# Patient Record
Sex: FEMALE | Race: WHITE | Employment: FULL TIME
[De-identification: names, ages, dates, MRNs, and addresses within clinical notes are randomized per-mention and may not be internally consistent; named-entity substitution may affect disease eponyms.]

---

## 2017-11-26 ENCOUNTER — HEALTH MAINTENANCE LETTER (OUTPATIENT)
Age: 37
End: 2017-11-26

## 2020-03-02 ENCOUNTER — HEALTH MAINTENANCE LETTER (OUTPATIENT)
Age: 40
End: 2020-03-02

## 2020-06-25 ENCOUNTER — TRANSFERRED RECORDS (OUTPATIENT)
Dept: HEALTH INFORMATION MANAGEMENT | Facility: CLINIC | Age: 40
End: 2020-06-25

## 2020-07-24 ENCOUNTER — TRANSFERRED RECORDS (OUTPATIENT)
Dept: HEALTH INFORMATION MANAGEMENT | Facility: CLINIC | Age: 40
End: 2020-07-24

## 2020-07-30 ENCOUNTER — TRANSFERRED RECORDS (OUTPATIENT)
Dept: HEALTH INFORMATION MANAGEMENT | Facility: CLINIC | Age: 40
End: 2020-07-30

## 2020-08-25 ENCOUNTER — MEDICAL CORRESPONDENCE (OUTPATIENT)
Dept: HEALTH INFORMATION MANAGEMENT | Facility: CLINIC | Age: 40
End: 2020-08-25

## 2020-08-28 ENCOUNTER — OFFICE VISIT (OUTPATIENT)
Dept: CARDIOLOGY | Facility: CLINIC | Age: 40
End: 2020-08-28
Payer: COMMERCIAL

## 2020-08-28 VITALS
OXYGEN SATURATION: 98 % | WEIGHT: 278 LBS | HEART RATE: 80 BPM | HEIGHT: 69 IN | DIASTOLIC BLOOD PRESSURE: 86 MMHG | BODY MASS INDEX: 41.18 KG/M2 | SYSTOLIC BLOOD PRESSURE: 140 MMHG

## 2020-08-28 DIAGNOSIS — I10 ESSENTIAL HYPERTENSION: ICD-10-CM

## 2020-08-28 DIAGNOSIS — E11.9 DIABETES MELLITUS WITHOUT COMPLICATION (H): ICD-10-CM

## 2020-08-28 DIAGNOSIS — Z13.6 SCREENING FOR CARDIOVASCULAR CONDITION: ICD-10-CM

## 2020-08-28 DIAGNOSIS — Z13.6 SCREENING FOR CARDIOVASCULAR CONDITION: Primary | ICD-10-CM

## 2020-08-28 LAB
ANION GAP SERPL CALCULATED.3IONS-SCNC: 5 MMOL/L (ref 3–14)
BUN SERPL-MCNC: 10 MG/DL (ref 7–30)
CALCIUM SERPL-MCNC: 8.9 MG/DL (ref 8.5–10.1)
CHLORIDE SERPL-SCNC: 106 MMOL/L (ref 94–109)
CHOLEST SERPL-MCNC: 145 MG/DL
CO2 SERPL-SCNC: 28 MMOL/L (ref 20–32)
CREAT SERPL-MCNC: 0.92 MG/DL (ref 0.52–1.04)
GFR SERPL CREATININE-BSD FRML MDRD: 77 ML/MIN/{1.73_M2}
GLUCOSE SERPL-MCNC: 101 MG/DL (ref 70–99)
HDLC SERPL-MCNC: 38 MG/DL
LDLC SERPL CALC-MCNC: 79 MG/DL
NONHDLC SERPL-MCNC: 107 MG/DL
POTASSIUM SERPL-SCNC: 4 MMOL/L (ref 3.4–5.3)
SODIUM SERPL-SCNC: 139 MMOL/L (ref 133–144)
TRIGL SERPL-MCNC: 140 MG/DL

## 2020-08-28 PROCEDURE — 80061 LIPID PANEL: CPT | Performed by: INTERNAL MEDICINE

## 2020-08-28 PROCEDURE — 36415 COLL VENOUS BLD VENIPUNCTURE: CPT | Performed by: INTERNAL MEDICINE

## 2020-08-28 PROCEDURE — 99204 OFFICE O/P NEW MOD 45 MIN: CPT | Performed by: INTERNAL MEDICINE

## 2020-08-28 PROCEDURE — 93000 ELECTROCARDIOGRAM COMPLETE: CPT | Performed by: INTERNAL MEDICINE

## 2020-08-28 PROCEDURE — 80048 BASIC METABOLIC PNL TOTAL CA: CPT | Performed by: INTERNAL MEDICINE

## 2020-08-28 RX ORDER — ATORVASTATIN CALCIUM 10 MG/1
TABLET, FILM COATED ORAL
COMMUNITY
Start: 2020-08-21 | End: 2020-12-23

## 2020-08-28 RX ORDER — METFORMIN HCL 500 MG
1000 TABLET, EXTENDED RELEASE 24 HR ORAL 2 TIMES DAILY WITH MEALS
COMMUNITY
Start: 2020-02-10

## 2020-08-28 RX ORDER — ESCITALOPRAM OXALATE 10 MG/1
10 TABLET ORAL DAILY
COMMUNITY
Start: 2020-08-22

## 2020-08-28 RX ORDER — LISINOPRIL 10 MG/1
TABLET ORAL
COMMUNITY
Start: 2020-07-01 | End: 2020-08-28

## 2020-08-28 RX ORDER — LISINOPRIL 20 MG/1
20 TABLET ORAL DAILY
Qty: 30 TABLET | Refills: 11 | Status: SHIPPED | OUTPATIENT
Start: 2020-08-28 | End: 2020-12-23

## 2020-08-28 ASSESSMENT — MIFFLIN-ST. JEOR: SCORE: 1995.38

## 2020-08-28 NOTE — LETTER
8/28/2020      Jammie White MD, MD  [facility] [address]  [facility] [address]      RE: Belinda Cotton       Dear Colleague,    I had the pleasure of seeing Belinda Cotton in the [facility].    Service Date: 08/28/2020      NEW PATIENT REFERRAL      REFERRING PHYSICIAN:  Dr. Jammie White      HISTORY OF PRESENT ILLNESS:  Ms. Cottno is a very pleasant 39-year-old female who is referred to our clinic today due to hypertension and differential pressures in her arms.  She has morbid obesity and type 2 diabetes as well.  She was recently placed in the last few months on lisinopril and atorvastatin.  In looking through the notes, it looks like the atorvastatin was placed due to the presumption that she may have peripheral arterial disease in her upper extremities, causing the differential blood pressures.  She is unaware of any cholesterol problems.  She showed me a note from her physician at said that her HDL was low but there is no comment about LDLs and I am not able to pull these up, so I am not sure if high cholesterol may have been another factor for the Lipitor.  She has had 2 children.  She did have preeclampsia with one and gestational diabetes.  She has a family history of high blood pressure.  No history of premature coronary disease.  She is a nonsmoker.  She does like to ride her bike but has not been exercising on a regular basis.  She describes that she had some numbness and tingling in both hands at times and this along with some headaches led to the measurement of her blood pressures in both arms.  It was noted that she had a discrepancy of greater than 10 mmHg between her left and right arm, right being higher and therefore, an upper extremity Doppler was ordered.  This was done on 07/30.  It was done at an outside facility, [facility] in [place].  I do have a copy of the report stating normal bilateral upper extremity arterial Doppler variation without  sign of vascular stenosis.  In looking at this note, however, the interpretation states that analysis was performed of the bilateral subclavian, axillary, brachial, radial and ulnar arteries.  It did not talk about imaging of the aortic arch or upper descending aorta.  We did perform an electrocardiogram in office today which I have reviewed.  This essentially looks like a normal EKG.  I repeated her blood pressures in both arms.  Initially, the medical assistant had gotten a different measurement of about 8 mmHg.  However, I repeated this and got 140/86 in 1 arm 142/80 in the other, so I did not really see a significant difference.  I did note that the auscultation in the left arm was a bit more soft.  May be that she has a smaller brachial artery on this side but I did not see a discrepancy in the pressures at all.  Her pulse was around 80.  Weight is now 278.  It looks like she has lost over 30 pounds in the last couple of years with effort in changing her diet and lifestyle.      In summary, Ms. Wade is a pleasant 39-year-old female with a history of morbid obesity, hypertension, possible HDL deficiency and type 2 diabetes, presenting with hypertension and a discrepancy in blood pressures in her arms.  I did talk to Ms. Wade today about her plans.  She has 2 children now.  It sounds like she had hoped for more children and it sounds like and she and her  may still be trying.  She denies being on oral contraception right now.  I did caution her about the teratogenic effects of both Lipitor and lisinopril and if she is trying to get pregnant, we should reconsider these medications.  We can certainly work with her, if she chooses that she wants to have more children, on finding appropriate blood pressure medication for her.  I would like to recheck her cholesterol and see where it is at.  I know she has been on Lipitor for a few months now.  I would like to see her numbers prior to starting Lipitor to  see if this is actually necessary or not and certainly can be disrupted for a year if needed if she would like to pursue having children.  In the meantime, I cautioned her that she should be using contraception.  I would like to get an echocardiogram given her high blood pressure and obesity to look for any evidence of hypertensive heart disease or pulmonary hypertension.  I will also look at the aortic root, ascending aorta, arch and descending aorta for any abnormalities that may be leading to a discrepancy as well.  Coarctation of the aorta would be extremely rare in isolation and I do not hear any abnormal cardiac sounds to indicate congenital heart disease but I think that we should take a look at this for several reasons.  In addition, after talking with her at length, she wants to do what is best for her and I agree that lisinopril is a good choice if she is no longer pursuing children but she looks like she probably should need a little bit higher dose than the 10 mg she had been on because she is still borderline hypertensive and so I did increase her dose to 20 mg, once again cautioning her if she decides, once she gets home and discusses it with her , that if they still want to pursue children, we can always discontinue this medicine and try something different throughout that effort.  I will follow up with her with the results of her echocardiogram, cholesterol measurements and if she wants to make any change in her blood pressure management.  Please feel free to contact me with any questions you have in regards to her care and thank you for allowing me to participate in the care of your very nice patient.      cc:   [place] [address]         MAYDA BEAVERS DO             D: 2020   T: 2020   MT: KRISTEL      Name:     MARIELA INGRAM   MRN:      [id number]        Account:      [id number]   :       1980           Service Date: 08/28/2020      Document: [id number]         Outpatient Encounter Medications as of 8/28/2020   Medication Sig Dispense Refill     lisinopril (ZESTRIL) 20 MG tablet Take 1 tablet (20 mg) by mouth daily 30 tablet 11     atorvastatin (LIPITOR) 10 MG tablet        escitalopram (LEXAPRO) 10 MG tablet TK 1/2 T PO D FOR 6 DAYS. THEN 1 T D       metFORMIN (GLUCOPHAGE-XR) 500 MG 24 hr tablet        [DISCONTINUED] lisinopril (ZESTRIL) 10 MG tablet        [DISCONTINUED] NO ACTIVE MEDICATIONS        No facility-administered encounter medications on file as of 8/28/2020.        Again, thank you for allowing me to participate in the care of your patient.      Sincerely,    Gisela Regalado,      [facility]

## 2020-08-28 NOTE — PROGRESS NOTES
Service Date: 08/28/2020      NEW PATIENT REFERRAL      REFERRING PHYSICIAN:  Dr. Jammie White      HISTORY OF PRESENT ILLNESS:  Ms. Cotton is a very pleasant 39-year-old female who is referred to our clinic today due to hypertension and differential pressures in her arms.  She has morbid obesity and type 2 diabetes as well.  She was recently placed in the last few months on lisinopril and atorvastatin.  In looking through the notes, it looks like the atorvastatin was placed due to the presumption that she may have peripheral arterial disease in her upper extremities, causing the differential blood pressures.  She is unaware of any cholesterol problems.  She showed me a note from her physician at said that her HDL was low but there is no comment about LDLs and I am not able to pull these up, so I am not sure if high cholesterol may have been another factor for the Lipitor.  She has had 2 children.  She did have preeclampsia with one and gestational diabetes.  She has a family history of high blood pressure.  No history of premature coronary disease.  She is a nonsmoker.  She does like to ride her bike but has not been exercising on a regular basis.  She describes that she had some numbness and tingling in both hands at times and this along with some headaches led to the measurement of her blood pressures in both arms.  It was noted that she had a discrepancy of greater than 10 mmHg between her left and right arm, right being higher and therefore, an upper extremity Doppler was ordered.  This was done on 07/30.  It was done at an outside facility, [facility] in [place].  I do have a copy of the report stating normal bilateral upper extremity arterial Doppler variation without sign of vascular stenosis.  In looking at this note, however, the interpretation states that analysis was performed of the bilateral subclavian, axillary, brachial, radial and ulnar arteries.  It did not talk about imaging of the aortic arch or upper  descending aorta.  We did perform an electrocardiogram in office today which I have reviewed.  This essentially looks like a normal EKG.  I repeated her blood pressures in both arms.  Initially, the medical assistant had gotten a different measurement of about 8 mmHg.  However, I repeated this and got 140/86 in 1 arm 142/80 in the other, so I did not really see a significant difference.  I did note that the auscultation in the left arm was a bit more soft.  May be that she has a smaller brachial artery on this side but I did not see a discrepancy in the pressures at all.  Her pulse was around 80.  Weight is now 278.  It looks like she has lost over 30 pounds in the last couple of years with effort in changing her diet and lifestyle.      In summary, Ms. Wade is a pleasant 39-year-old female with a history of morbid obesity, hypertension, possible HDL deficiency and type 2 diabetes, presenting with hypertension and a discrepancy in blood pressures in her arms.  I did talk to Ms. Wade today about her plans.  She has 2 children now.  It sounds like she had hoped for more children and it sounds like and she and her  may still be trying.  She denies being on oral contraception right now.  I did caution her about the teratogenic effects of both Lipitor and lisinopril and if she is trying to get pregnant, we should reconsider these medications.  We can certainly work with her, if she chooses that she wants to have more children, on finding appropriate blood pressure medication for her.  I would like to recheck her cholesterol and see where it is at.  I know she has been on Lipitor for a few months now.  I would like to see her numbers prior to starting Lipitor to see if this is actually necessary or not and certainly can be disrupted for a year if needed if she would like to pursue having children.  In the meantime, I cautioned her that she should be using contraception.  I would like to get an echocardiogram  given her high blood pressure and obesity to look for any evidence of hypertensive heart disease or pulmonary hypertension.  I will also look at the aortic root, ascending aorta, arch and descending aorta for any abnormalities that may be leading to a discrepancy as well.  Coarctation of the aorta would be extremely rare in isolation and I do not hear any abnormal cardiac sounds to indicate congenital heart disease but I think that we should take a look at this for several reasons.  In addition, after talking with her at length, she wants to do what is best for her and I agree that lisinopril is a good choice if she is no longer pursuing children but she looks like she probably should need a little bit higher dose than the 10 mg she had been on because she is still borderline hypertensive and so I did increase her dose to 20 mg, once again cautioning her if she decides, once she gets home and discusses it with her , that if they still want to pursue children, we can always discontinue this medicine and try something different throughout that effort.  I will follow up with her with the results of her echocardiogram, cholesterol measurements and if she wants to make any change in her blood pressure management.  Please feel free to contact me with any questions you have in regards to her care and thank you for allowing me to participate in the care of your very nice patient.      cc:   [place] [address]         MAYDA BEAVERS DO             D: 2020   T: 2020   MT: KRISTEL      Name:     MARIELA INGRAM   MRN:      [id number]        Account:      [id number]   :      1980           Service Date: 2020      Document: [id number]

## 2020-08-28 NOTE — PROGRESS NOTES
HPI and Plan:   See dictation    Orders Placed This Encounter   Procedures     Basic metabolic panel     Lipid Profile     Follow-Up with Cardiologist     EKG 12-lead complete w/read - Clinics (performed today)     Echocardiogram Complete       Orders Placed This Encounter   Medications     DISCONTD: lisinopril (ZESTRIL) 10 MG tablet     escitalopram (LEXAPRO) 10 MG tablet     Sig: TK 1/2 T PO D FOR 6 DAYS. THEN 1 T D     atorvastatin (LIPITOR) 10 MG tablet     metFORMIN (GLUCOPHAGE-XR) 500 MG 24 hr tablet     lisinopril (ZESTRIL) 20 MG tablet     Sig: Take 1 tablet (20 mg) by mouth daily     Dispense:  30 tablet     Refill:  11       Medications Discontinued During This Encounter   Medication Reason     NO ACTIVE MEDICATIONS Medication Reconciliation Clean Up     lisinopril (ZESTRIL) 10 MG tablet Reorder         Encounter Diagnoses   Name Primary?     Screening for cardiovascular condition Yes     Essential hypertension      Diabetes mellitus without complication (H)        CURRENT MEDICATIONS:  Current Outpatient Medications   Medication Sig Dispense Refill     lisinopril (ZESTRIL) 20 MG tablet Take 1 tablet (20 mg) by mouth daily 30 tablet 11     atorvastatin (LIPITOR) 10 MG tablet        escitalopram (LEXAPRO) 10 MG tablet TK 1/2 T PO D FOR 6 DAYS. THEN 1 T D       metFORMIN (GLUCOPHAGE-XR) 500 MG 24 hr tablet          ALLERGIES   No Known Allergies    PAST MEDICAL HISTORY:  Past Medical History:   Diagnosis Date     Gestational diabetes 2008     PCOS (polycystic ovarian syndrome)     hx of     Preeclampsia 2001     Uterine fibroid        PAST SURGICAL HISTORY:  Past Surgical History:   Procedure Laterality Date     D & C  2007    missed ab     HC REMOVAL OF TONSILS,<13 Y/O  1983     THYROID SURGERY  1995       FAMILY HISTORY:  Family History   Problem Relation Age of Onset     Heart Disease Mother         pace maker in early 30's     Kidney Disease Mother         Kidney issues     Hypertension Father       Breast Cancer Paternal Grandmother      Breast Cancer Paternal Aunt      Lipids Father        SOCIAL HISTORY:  Social History     Socioeconomic History     Marital status:      Spouse name: None     Number of children: None     Years of education: None     Highest education level: None   Occupational History     None   Social Needs     Financial resource strain: None     Food insecurity     Worry: None     Inability: None     Transportation needs     Medical: None     Non-medical: None   Tobacco Use     Smoking status: Never Smoker     Smokeless tobacco: Never Used   Substance and Sexual Activity     Alcohol use: Yes     Alcohol/week: 0.0 standard drinks     Comment: socially     Drug use: No     Sexual activity: Yes     Partners: Male     Birth control/protection: None   Lifestyle     Physical activity     Days per week: None     Minutes per session: None     Stress: None   Relationships     Social connections     Talks on phone: None     Gets together: None     Attends [employer] service: None     Active member of club or organization: None     Attends meetings of clubs or organizations: None     Relationship status: None     Intimate partner violence     Fear of current or ex partner: None     Emotionally abused: None     Physically abused: None     Forced sexual activity: None   Other Topics Concern     None   Social History Narrative     None       Review of Systems:  Skin:  Negative       Eyes:  Positive for      ENT:  Negative      Respiratory:  Negative       Cardiovascular:  Negative      Gastroenterology: Negative      Genitourinary:  Negative      Musculoskeletal:  Negative   muscular cramping in upper  left arm  Neurologic:  Positive for numbness or tingling of hands;numbness or tingling of feet sometimes in the feet  Psychiatric:  Positive for excessive stress    Heme/Lymph/Imm:  Negative      Endocrine:  Positive for diabetes      Physical Exam:  Vitals: BP (!) 140/86 (BP Location: Left arm,  "Patient Position: Sitting, Cuff Size: Adult Regular)   Pulse 80   Ht 1.753 m (5' 9\")   Wt 126.1 kg (278 lb)   SpO2 98%   BMI 41.05 kg/m      Constitutional:  cooperative;in no acute distress        Skin:  warm and dry to the touch          Head:  normocephalic        Eyes:  pupils equal and round        Lymph:      ENT:  no pallor or cyanosis, dentition good        Neck:  carotid pulses are full and equal bilaterally        Respiratory:  clear to auscultation;normal symmetry         Cardiac: regular rhythm;no murmurs, gallops or rubs detected                pulses full and equal                                        GI:  abdomen soft obese      Extremities and Muscular Skeletal:  no deformities, clubbing, cyanosis, erythema observed;no edema              Neurological:  no gross motor deficits;affect appropriate        Psych:  Alert and Oriented x 3          CC  No referring provider defined for this encounter.                  "

## 2020-08-28 NOTE — LETTER
8/28/2020    Jammie White MD, MD  [facility] [address]  [facility] [address]    RE: Belinda Cotton       Dear Colleague,    I had the pleasure of seeing Belinda Cotton in the [facility].    HPI and Plan:   See dictation    Orders Placed This Encounter   Procedures     Basic metabolic panel     Lipid Profile     Follow-Up with Cardiologist     EKG 12-lead complete w/read - Clinics (performed today)     Echocardiogram Complete       Orders Placed This Encounter   Medications     DISCONTD: lisinopril (ZESTRIL) 10 MG tablet     escitalopram (LEXAPRO) 10 MG tablet     Sig: TK 1/2 T PO D FOR 6 DAYS. THEN 1 T D     atorvastatin (LIPITOR) 10 MG tablet     metFORMIN (GLUCOPHAGE-XR) 500 MG 24 hr tablet     lisinopril (ZESTRIL) 20 MG tablet     Sig: Take 1 tablet (20 mg) by mouth daily     Dispense:  30 tablet     Refill:  11       Medications Discontinued During This Encounter   Medication Reason     NO ACTIVE MEDICATIONS Medication Reconciliation Clean Up     lisinopril (ZESTRIL) 10 MG tablet Reorder         Encounter Diagnoses   Name Primary?     Screening for cardiovascular condition Yes     Essential hypertension      Diabetes mellitus without complication (H)        CURRENT MEDICATIONS:  Current Outpatient Medications   Medication Sig Dispense Refill     lisinopril (ZESTRIL) 20 MG tablet Take 1 tablet (20 mg) by mouth daily 30 tablet 11     atorvastatin (LIPITOR) 10 MG tablet        escitalopram (LEXAPRO) 10 MG tablet TK 1/2 T PO D FOR 6 DAYS. THEN 1 T D       metFORMIN (GLUCOPHAGE-XR) 500 MG 24 hr tablet          ALLERGIES   No Known Allergies    PAST MEDICAL HISTORY:  Past Medical History:   Diagnosis Date     Gestational diabetes 2008     PCOS (polycystic ovarian syndrome)     hx of     Preeclampsia 2001     Uterine fibroid        PAST SURGICAL HISTORY:  Past Surgical History:   Procedure Laterality Date     D & C  2007    missed ab     HC REMOVAL OF  TONSILS,<13 Y/O  1983     THYROID SURGERY  1995       FAMILY HISTORY:  Family History   Problem Relation Age of Onset     Heart Disease Mother         pace maker in early 30's     Kidney Disease Mother         Kidney issues     Hypertension Father      Breast Cancer Paternal Grandmother      Breast Cancer Paternal Aunt      Lipids Father        SOCIAL HISTORY:  Social History     Socioeconomic History     Marital status:      Spouse name: None     Number of children: None     Years of education: None     Highest education level: None   Occupational History     None   Social Needs     Financial resource strain: None     Food insecurity     Worry: None     Inability: None     Transportation needs     Medical: None     Non-medical: None   Tobacco Use     Smoking status: Never Smoker     Smokeless tobacco: Never Used   Substance and Sexual Activity     Alcohol use: Yes     Alcohol/week: 0.0 standard drinks     Comment: socially     Drug use: No     Sexual activity: Yes     Partners: Male     Birth control/protection: None   Lifestyle     Physical activity     Days per week: None     Minutes per session: None     Stress: None   Relationships     Social connections     Talks on phone: None     Gets together: None     Attends Holiness service: None     Active member of club or organization: None     Attends meetings of clubs or organizations: None     Relationship status: None     Intimate partner violence     Fear of current or ex partner: None     Emotionally abused: None     Physically abused: None     Forced sexual activity: None   Other Topics Concern     None   Social History Narrative     None       Review of Systems:  Skin:  Negative       Eyes:  Positive for      ENT:  Negative      Respiratory:  Negative       Cardiovascular:  Negative      Gastroenterology: Negative      Genitourinary:  Negative      Musculoskeletal:  Negative   muscular cramping in upper  left arm  Neurologic:  Positive for numbness or  "tingling of hands;numbness or tingling of feet sometimes in the feet  Psychiatric:  Positive for excessive stress    Heme/Lymph/Imm:  Negative      Endocrine:  Positive for diabetes      Physical Exam:  Vitals: BP (!) 140/86 (BP Location: Left arm, Patient Position: Sitting, Cuff Size: Adult Regular)   Pulse 80   Ht 1.753 m (5' 9\")   Wt 126.1 kg (278 lb)   SpO2 98%   BMI 41.05 kg/m      Constitutional:  cooperative;in no acute distress        Skin:  warm and dry to the touch          Head:  normocephalic        Eyes:  pupils equal and round        Lymph:      ENT:  no pallor or cyanosis, dentition good        Neck:  carotid pulses are full and equal bilaterally        Respiratory:  clear to auscultation;normal symmetry         Cardiac: regular rhythm;no murmurs, gallops or rubs detected                pulses full and equal                                        GI:  abdomen soft obese      Extremities and Muscular Skeletal:  no deformities, clubbing, cyanosis, erythema observed;no edema              Neurological:  no gross motor deficits;affect appropriate        Psych:  Alert and Oriented x 3          CC  No referring provider defined for this encounter.                    Thank you for allowing me to participate in the care of your patient.      Sincerely,     Gisela Regalado,      [facility]    cc:   No referring provider defined for this encounter.        "

## 2020-08-28 NOTE — PATIENT INSTRUCTIONS
METABOLIC SYNDROME    WARNING: IF YOU WANT TO HAVE CHILDREN, NEED TO STOP LISINOPRIL AND LIPITOR. WE CAN WORK WITH YOU DURING THIS TIME TO TREAT YOUR BLOOD PRESSURE SAFELY.

## 2020-09-01 ENCOUNTER — TELEPHONE (OUTPATIENT)
Dept: CARDIOLOGY | Facility: CLINIC | Age: 40
End: 2020-09-01

## 2020-09-02 ENCOUNTER — HOSPITAL ENCOUNTER (OUTPATIENT)
Dept: CARDIOLOGY | Facility: CLINIC | Age: 40
Discharge: HOME | End: 2020-09-02
Attending: INTERNAL MEDICINE | Admitting: INTERNAL MEDICINE
Payer: COMMERCIAL

## 2020-09-02 DIAGNOSIS — Z13.6 SCREENING FOR CARDIOVASCULAR CONDITION: ICD-10-CM

## 2020-09-02 DIAGNOSIS — E11.9 DIABETES MELLITUS WITHOUT COMPLICATION (H): Primary | ICD-10-CM

## 2020-09-02 DIAGNOSIS — I10 ESSENTIAL HYPERTENSION: ICD-10-CM

## 2020-09-02 PROCEDURE — 93306 TTE W/DOPPLER COMPLETE: CPT | Mod: 26 | Performed by: INTERNAL MEDICINE

## 2020-09-02 PROCEDURE — 93306 TTE W/DOPPLER COMPLETE: CPT

## 2020-09-02 PROCEDURE — 25500064 ZZH RX 255 OP 636: Performed by: INTERNAL MEDICINE

## 2020-09-02 RX ADMIN — HUMAN ALBUMIN MICROSPHERES AND PERFLUTREN 9 ML: 10; .22 INJECTION, SOLUTION INTRAVENOUS at 08:31

## 2020-09-25 ENCOUNTER — OFFICE VISIT (OUTPATIENT)
Dept: CARDIOLOGY | Facility: CLINIC | Age: 40
End: 2020-09-25
Attending: INTERNAL MEDICINE
Payer: COMMERCIAL

## 2020-09-25 VITALS
HEIGHT: 69 IN | WEIGHT: 277.6 LBS | DIASTOLIC BLOOD PRESSURE: 86 MMHG | HEART RATE: 78 BPM | SYSTOLIC BLOOD PRESSURE: 134 MMHG | BODY MASS INDEX: 41.12 KG/M2

## 2020-09-25 DIAGNOSIS — I10 ESSENTIAL HYPERTENSION: ICD-10-CM

## 2020-09-25 DIAGNOSIS — Z13.6 SCREENING FOR CARDIOVASCULAR CONDITION: ICD-10-CM

## 2020-09-25 DIAGNOSIS — E11.9 DIABETES MELLITUS WITHOUT COMPLICATION (H): ICD-10-CM

## 2020-09-25 PROCEDURE — 99214 OFFICE O/P EST MOD 30 MIN: CPT | Performed by: INTERNAL MEDICINE

## 2020-09-25 RX ORDER — LABETALOL 200 MG/1
200 TABLET, FILM COATED ORAL 2 TIMES DAILY
Qty: 60 TABLET | Refills: 11 | Status: SHIPPED | OUTPATIENT
Start: 2020-09-25

## 2020-09-25 ASSESSMENT — MIFFLIN-ST. JEOR: SCORE: 1988.57

## 2020-09-25 NOTE — LETTER
9/25/2020      Jammie White MD, MD  [facility] [address]  [facility] [address]      RE: Belinda Cotton       Dear Colleague,    I had the pleasure of seeing Belinda Cotton in the [facility].    Service Date: 09/25/2020      CARDIOLOGY CONSULTATION      HISTORY OF PRESENT ILLNESS:  Ms. Cotton is a very pleasant 40-year-old female with a history of hypertension, type 2 diabetes and obesity.  She was seen in late August due to a concern of a discrepancy in blood pressures between the left and right arms.  She did have an upper extremity ultrasound that did not show any evidence of arterial stenosis or vascular stenosis.  I did add an echocardiogram because of a history of high blood pressure to look at her aortic arch as well and she is here to go over those tests results.  The last time I saw her, we did increase her lisinopril dose for better blood pressure control and also talked about if she is considering having children that we would need to switch this medication and also take her off of statin therapy.        I went over the echocardiogram, which looks normal.  Images were somewhat technically difficult, but there were no abnormal velocity changes throughout the aorta or descending aorta.  LV function looked normal.  She has mild tricuspid insufficiency, but other valves looked normal.        Her blood pressure looks better today 134/86 and a pulse of 78.  She is actively trying to lose weight.  Her weight went down about a pound, down to 277.  She has noted more of a change in her clothing, which suggests she may be plateauing a bit and getting some muscle mass with her exercise and diet.  Her other physical exam findings were otherwise unchanged.        She did mention today that her and she and her  have discussed and would like to continue to pursue actively trying to have a child and so we talked about the teratogenic effects of ACE  inhibitor and statin therapy.  At this time, I am going to recommend she stop the Lipitor.  We will change her blood pressure medication to labetalol.  While there is no class A or B drugs in pregnancy, labetalol has long-term data suggesting its relatively safe.  I explained this to her.  However, lisinopril does have known teratogenic effects and therefore should not be continued while actively trying to have children, so I will switch her to 200 mg of labetalol twice daily and stop her Lipitor.        She will contact me through [employer] if she has any side effect.  I did go over the potential side effects with her by the way, and whether or not that dosage is appropriate to control her blood pressures.   We can discuss reinstituting statin therapy once she is through her childbearing years.        Please feel free to contact me with any questions you have in regards to her care.      cc:      Jammie White MD    [facility]   [address] [place] [address]         GISELA REGALADO DO             D: 2020   T: 2020   MT:       Name:     MARIELA INGRAM   MRN:      [id number]        Account:      [id number]   :      1980           Service Date: 2020      Document: [id number]          Outpatient Encounter Medications as of 2020   Medication Sig Dispense Refill     atorvastatin (LIPITOR) 10 MG tablet        escitalopram (LEXAPRO) 10 MG tablet TK 1/2 T PO D FOR 6 DAYS. THEN 1 T D       labetalol (NORMODYNE) 200 MG tablet Take 1 tablet (200 mg) by mouth 2 times daily 60 tablet 11     lisinopril (ZESTRIL) 20 MG tablet Take 1 tablet (20 mg) by mouth daily 30 tablet 11     metFORMIN (GLUCOPHAGE-XR) 500 MG 24 hr tablet        No facility-administered encounter medications on file as of 2020.        Again, thank you for allowing me to participate in the care of your patient.      Sincerely,    Gisela Regalado DO     [facility]  [facility]

## 2020-09-25 NOTE — LETTER
9/25/2020    Jammie White MD, MD  [facility] [address]  [facility] [address]    RE: Belinda Cotton       Dear Colleague,    I had the pleasure of seeing Belinda Cotton in the [facility].    HPI and Plan:   See dictation    No orders of the defined types were placed in this encounter.      Orders Placed This Encounter   Medications     labetalol (NORMODYNE) 200 MG tablet     Sig: Take 1 tablet (200 mg) by mouth 2 times daily     Dispense:  60 tablet     Refill:  11       There are no discontinued medications.      Encounter Diagnoses   Name Primary?     Screening for cardiovascular condition      Essential hypertension      Diabetes mellitus without complication (H)        CURRENT MEDICATIONS:  Current Outpatient Medications   Medication Sig Dispense Refill     atorvastatin (LIPITOR) 10 MG tablet        escitalopram (LEXAPRO) 10 MG tablet TK 1/2 T PO D FOR 6 DAYS. THEN 1 T D       labetalol (NORMODYNE) 200 MG tablet Take 1 tablet (200 mg) by mouth 2 times daily 60 tablet 11     lisinopril (ZESTRIL) 20 MG tablet Take 1 tablet (20 mg) by mouth daily 30 tablet 11     metFORMIN (GLUCOPHAGE-XR) 500 MG 24 hr tablet          ALLERGIES   No Known Allergies    PAST MEDICAL HISTORY:  Past Medical History:   Diagnosis Date     Gestational diabetes 2008     PCOS (polycystic ovarian syndrome)     hx of     Preeclampsia 2001     Uterine fibroid        PAST SURGICAL HISTORY:  Past Surgical History:   Procedure Laterality Date     D & C  2007    missed ab     HC REMOVAL OF TONSILS,<11 Y/O  1983     THYROID SURGERY  1995       FAMILY HISTORY:  Family History   Problem Relation Age of Onset     Heart Disease Mother         pace maker in early 30's     Kidney Disease Mother         Kidney issues     Hypertension Father      Lipids Father      Breast Cancer Paternal Grandmother      Breast Cancer Paternal Aunt        SOCIAL HISTORY:  Social History     Socioeconomic History      "Marital status:      Spouse name: None     Number of children: None     Years of education: None     Highest education level: None   Occupational History     None   Social Needs     Financial resource strain: None     Food insecurity     Worry: None     Inability: None     Transportation needs     Medical: None     Non-medical: None   Tobacco Use     Smoking status: Never Smoker     Smokeless tobacco: Never Used   Substance and Sexual Activity     Alcohol use: Yes     Alcohol/week: 0.0 standard drinks     Comment: socially     Drug use: No     Sexual activity: Yes     Partners: Male     Birth control/protection: None   Lifestyle     Physical activity     Days per week: None     Minutes per session: None     Stress: None   Relationships     Social connections     Talks on phone: None     Gets together: None     Attends Latter day service: None     Active member of club or organization: None     Attends meetings of clubs or organizations: None     Relationship status: None     Intimate partner violence     Fear of current or ex partner: None     Emotionally abused: None     Physically abused: None     Forced sexual activity: None   Other Topics Concern     None   Social History Narrative     None       Review of Systems:  Skin:  Negative       Eyes:  Positive for glasses    ENT:  Negative      Respiratory:  Negative       Cardiovascular:  Negative      Gastroenterology: Negative      Genitourinary:  Negative      Musculoskeletal:  Negative      Neurologic:  Negative      Psychiatric:  Negative      Heme/Lymph/Imm:  Negative      Endocrine:  Positive for diabetes      Physical Exam:  Vitals: /86 (BP Location: Right arm, Patient Position: Sitting, Cuff Size: Adult Regular)   Pulse 78   Ht 1.753 m (5' 9\")   Wt 125.9 kg (277 lb 9.6 oz)   LMP  (LMP Unknown)   Breastfeeding No   BMI 40.99 kg/m      Constitutional:  cooperative;in no acute distress        Skin:  warm and dry to the touch          Head:  " normocephalic        Eyes:  pupils equal and round        Lymph:      ENT:  no pallor or cyanosis, dentition good        Neck:  carotid pulses are full and equal bilaterally        Respiratory:  clear to auscultation;normal symmetry         Cardiac: regular rhythm;no murmurs, gallops or rubs detected                pulses full and equal                                        GI:  abdomen soft obese      Extremities and Muscular Skeletal:  no deformities, clubbing, cyanosis, erythema observed;no edema              Neurological:  no gross motor deficits;affect appropriate        Psych:  Alert and Oriented x 3          CC  Gisela Regalado DO  [address]  [place] [address]                    Thank you for allowing me to participate in the care of your patient.      Sincerely,     Gisela Regalado DO     [facility]    cc:   Gisela Regalado DO  [address]  [place] [address]

## 2020-09-25 NOTE — PROGRESS NOTES
HPI and Plan:   See dictation    No orders of the defined types were placed in this encounter.      Orders Placed This Encounter   Medications     labetalol (NORMODYNE) 200 MG tablet     Sig: Take 1 tablet (200 mg) by mouth 2 times daily     Dispense:  60 tablet     Refill:  11       There are no discontinued medications.      Encounter Diagnoses   Name Primary?     Screening for cardiovascular condition      Essential hypertension      Diabetes mellitus without complication (H)        CURRENT MEDICATIONS:  Current Outpatient Medications   Medication Sig Dispense Refill     atorvastatin (LIPITOR) 10 MG tablet        escitalopram (LEXAPRO) 10 MG tablet TK 1/2 T PO D FOR 6 DAYS. THEN 1 T D       labetalol (NORMODYNE) 200 MG tablet Take 1 tablet (200 mg) by mouth 2 times daily 60 tablet 11     lisinopril (ZESTRIL) 20 MG tablet Take 1 tablet (20 mg) by mouth daily 30 tablet 11     metFORMIN (GLUCOPHAGE-XR) 500 MG 24 hr tablet          ALLERGIES   No Known Allergies    PAST MEDICAL HISTORY:  Past Medical History:   Diagnosis Date     Gestational diabetes 2008     PCOS (polycystic ovarian syndrome)     hx of     Preeclampsia 2001     Uterine fibroid        PAST SURGICAL HISTORY:  Past Surgical History:   Procedure Laterality Date     D & C  2007    missed ab     HC REMOVAL OF TONSILS,<13 Y/O  1983     THYROID SURGERY  1995       FAMILY HISTORY:  Family History   Problem Relation Age of Onset     Heart Disease Mother         pace maker in early 30's     Kidney Disease Mother         Kidney issues     Hypertension Father      Lipids Father      Breast Cancer Paternal Grandmother      Breast Cancer Paternal Aunt        SOCIAL HISTORY:  Social History     Socioeconomic History     Marital status:      Spouse name: None     Number of children: None     Years of education: None     Highest education level: None   Occupational History     None   Social Needs     Financial resource strain: None     Food insecurity      "Worry: None     Inability: None     Transportation needs     Medical: None     Non-medical: None   Tobacco Use     Smoking status: Never Smoker     Smokeless tobacco: Never Used   Substance and Sexual Activity     Alcohol use: Yes     Alcohol/week: 0.0 standard drinks     Comment: socially     Drug use: No     Sexual activity: Yes     Partners: Male     Birth control/protection: None   Lifestyle     Physical activity     Days per week: None     Minutes per session: None     Stress: None   Relationships     Social connections     Talks on phone: None     Gets together: None     Attends Nondenominational service: None     Active member of club or organization: None     Attends meetings of clubs or organizations: None     Relationship status: None     Intimate partner violence     Fear of current or ex partner: None     Emotionally abused: None     Physically abused: None     Forced sexual activity: None   Other Topics Concern     None   Social History Narrative     None       Review of Systems:  Skin:  Negative       Eyes:  Positive for glasses    ENT:  Negative      Respiratory:  Negative       Cardiovascular:  Negative      Gastroenterology: Negative      Genitourinary:  Negative      Musculoskeletal:  Negative      Neurologic:  Negative      Psychiatric:  Negative      Heme/Lymph/Imm:  Negative      Endocrine:  Positive for diabetes      Physical Exam:  Vitals: /86 (BP Location: Right arm, Patient Position: Sitting, Cuff Size: Adult Regular)   Pulse 78   Ht 1.753 m (5' 9\")   Wt 125.9 kg (277 lb 9.6 oz)   LMP  (LMP Unknown)   Breastfeeding No   BMI 40.99 kg/m      Constitutional:  cooperative;in no acute distress        Skin:  warm and dry to the touch          Head:  normocephalic        Eyes:  pupils equal and round        Lymph:      ENT:  no pallor or cyanosis, dentition good        Neck:  carotid pulses are full and equal bilaterally        Respiratory:  clear to auscultation;normal symmetry     "     Cardiac: regular rhythm;no murmurs, gallops or rubs detected                pulses full and equal                                        GI:  abdomen soft obese      Extremities and Muscular Skeletal:  no deformities, clubbing, cyanosis, erythema observed;no edema              Neurological:  no gross motor deficits;affect appropriate        Psych:  Alert and Oriented x 3          CC  Gisela Regalado, DO  [address]  [place] [address]

## 2020-09-25 NOTE — PROGRESS NOTES
Service Date: 09/25/2020      CARDIOLOGY CONSULTATION      HISTORY OF PRESENT ILLNESS:  Ms. Cotton is a very pleasant 40-year-old female with a history of hypertension, type 2 diabetes and obesity.  She was seen in late August due to a concern of a discrepancy in blood pressures between the left and right arms.  She did have an upper extremity ultrasound that did not show any evidence of arterial stenosis or vascular stenosis.  I did add an echocardiogram because of a history of high blood pressure to look at her aortic arch as well and she is here to go over those tests results.  The last time I saw her, we did increase her lisinopril dose for better blood pressure control and also talked about if she is considering having children that we would need to switch this medication and also take her off of statin therapy.        I went over the echocardiogram, which looks normal.  Images were somewhat technically difficult, but there were no abnormal velocity changes throughout the aorta or descending aorta.  LV function looked normal.  She has mild tricuspid insufficiency, but other valves looked normal.        Her blood pressure looks better today 134/86 and a pulse of 78.  She is actively trying to lose weight.  Her weight went down about a pound, down to 277.  She has noted more of a change in her clothing, which suggests she may be plateauing a bit and getting some muscle mass with her exercise and diet.  Her other physical exam findings were otherwise unchanged.        She did mention today that her and she and her  have discussed and would like to continue to pursue actively trying to have a child and so we talked about the teratogenic effects of ACE inhibitor and statin therapy.  At this time, I am going to recommend she stop the Lipitor.  We will change her blood pressure medication to labetalol.  While there is no class A or B drugs in pregnancy, labetalol has long-term data suggesting its relatively  safe.  I explained this to her.  However, lisinopril does have known teratogenic effects and therefore should not be continued while actively trying to have children, so I will switch her to 200 mg of labetalol twice daily and stop her Lipitor.        She will contact me through [employer] if she has any side effect.  I did go over the potential side effects with her by the way, and whether or not that dosage is appropriate to control her blood pressures.   We can discuss reinstituting statin therapy once she is through her childbearing years.        Please feel free to contact me with any questions you have in regards to her care.      cc:      Jammie White MD    [facility]   [address] [place] [address]         MAYDA BEAVERS DO             D: 2020   T: 2020   MT: MADDIE      Name:     MARIELA INGRAM   MRN:      [id number]        Account:      [id number]   :      1980           Service Date: 2020      Document: [id number]

## 2020-12-16 ENCOUNTER — TELEPHONE (OUTPATIENT)
Dept: CARDIOLOGY | Facility: CLINIC | Age: 40
End: 2020-12-16

## 2020-12-16 NOTE — TELEPHONE ENCOUNTER
Received call from pt reporting she has noted some high BP readings at home for the past couple days. Pt stated she used to check her BP often but had stopped checking it for the past few months until a couple days ago. Pt stated she was having some fatigue and headaches so she started to check her BP. Pt also reported a throbbing feeling in her ears last night. Reported the following blood pressures:     12/14/30 - 144/92, 148/95   12/15/20 -  150/98, 160/101    Pt denied any recent dietary changes or increased sodium intake. Pt noted that she has not been exercising lately due to the pandemic and gyms being closed. Pt noted she is also under increased stress. Inquired about NSAID use, pt stated she took ibuprofen last night but does not routinely use NSAIDs. Reviewed to try and follow a low sodium diet and that use of NSAIDs can increase BP too. Offered LUCINDA visit to review BP readings and pt agreed. Scheduled pt for video visit with CLAIRE Castro on 12/23/20 at 3:30 pm. Requested pt continue to monitor her BP daily and keep a log to review at visit. Pt verbalized understanding and agreement with plan.

## 2020-12-20 ENCOUNTER — HEALTH MAINTENANCE LETTER (OUTPATIENT)
Age: 40
End: 2020-12-20

## 2020-12-23 ENCOUNTER — VIRTUAL VISIT (OUTPATIENT)
Dept: CARDIOLOGY | Facility: CLINIC | Age: 40
End: 2020-12-23
Payer: COMMERCIAL

## 2020-12-23 DIAGNOSIS — I10 ESSENTIAL HYPERTENSION: Primary | ICD-10-CM

## 2020-12-23 DIAGNOSIS — E78.2 MIXED HYPERLIPIDEMIA: ICD-10-CM

## 2020-12-23 PROCEDURE — 99213 OFFICE O/P EST LOW 20 MIN: CPT | Mod: 95 | Performed by: PHYSICIAN ASSISTANT

## 2020-12-23 RX ORDER — NITROFURANTOIN MACROCRYSTAL 100 MG
100 CAPSULE ORAL 2 TIMES DAILY
COMMUNITY
Start: 2020-12-22 | End: 2020-12-25

## 2020-12-23 RX ORDER — TAMSULOSIN HYDROCHLORIDE 0.4 MG/1
0.4 CAPSULE ORAL DAILY
COMMUNITY
Start: 2020-12-22 | End: 2020-12-26

## 2020-12-23 NOTE — LETTER
"12/23/2020    Jammie White MD, MD  [facility] [address]  [facility] [address]    RE: Belinda Cotton       Dear Colleague,    I had the pleasure of seeing Belinda Cotton in the [facility].    Service Date: 12/23/2020     Belinda Cotton is a 40 year old female who is being evaluated via a billable video visit.      The patient has been notified of following:     \"This video visit will be conducted via a call between you and your physician/provider. We have found that certain health care needs can be provided without the need for an in-person physical exam.  This service lets us provide the care you need with a video conversation.  If a prescription is necessary we can send it directly to your pharmacy.  If lab work is needed we can place an order for that and you can then stop by our lab to have the test done at a later time.    Video visits are billed at different rates depending on your insurance coverage.  Please reach out to your insurance provider with any questions.    If during the course of the call the physician/provider feels a video visit is not appropriate, you will not be charged for this service.\"    Patient has given verbal consent for Video visit? Yes  How would you like to obtain your AVS? Mail a copy  If you are dropped from the video visit, the video invite should be resent to: Text to cell phone: [phone]  Will anyone else be joining your video visit? No       Review Of Systems  Skin: negative  Eyes: negative  Ears/Nose/Throat: negative  Respiratory: No shortness of breath, dyspnea on exertion, cough, or hemoptysis  Cardiovascular: negative  Gastrointestinal: negative  Genitourinary: positive for kidney stone  Musculoskeletal: negative  Neurologic: headaches  Psychiatric: negative  Hematologic/Lymphatic/Immunologic: negative  Endocrine: diabetes    Patient reported vitals:  BP:147/89  Heart rate:  Weight: 275lb      Dana Aimee " [facility]      Video-Visit Details    Type of service:  Video Visit    Video Start Time: 3:42 PM  Video End Time: 3:51 PM    Originating Location (pt. Location): Home    Distant Location (provider location):  [facility]     Platform used for Video Visit: [employer]       REASON FOR VISIT:  Belinda Cotton is a pleasant 40 year old female who is being evaluated via a billable video visit in order to minimize the possibility of exposure due to the current COVID-19 pandemic. She requested this visit due to elevated BP readings.      She has a hx of HTN and HL and has seen Dr Regalado a couple times for such. Most recently in Sept she was taken off lisinopril and Lipitor as she was still considering trying to conceive additional children. She was started on labetalol.    Belinda states her BP was well controlled after starting the labetalol and she stopped checking it frequently. About 1-2 weeks ago, she was feeling a bit tired and had a headache, so she decided to check her BP. It has been somewhat elevated since then. -160s, DBP 80-90s. No CP or SOB. No recent diet changes or meds/supplements. Notes that yesterday she was dx with kidney stones and a UTI after experiencing flank pain. She was started on an abx.      CURRENT MEDICATIONS:   Current Outpatient Medications   Medication Sig Dispense Refill     escitalopram (LEXAPRO) 10 MG tablet 10 mg daily        labetalol (NORMODYNE) 200 MG tablet Take 1 tablet (200 mg) by mouth 2 times daily 60 tablet 11     metFORMIN (GLUCOPHAGE-XR) 500 MG 24 hr tablet 1,000 mg 2 times daily (with meals)        nitroFURantoin macrocrystal (MACRODANTIN) 100 MG capsule Take 100 mg by mouth 2 times daily       tamsulosin (FLOMAX) 0.4 MG capsule Take 0.4 mg by mouth daily         PHYSICAL EXAMINATION:   GENERAL: Healthy, alert and no distress  EYES: Eyes grossly normal to inspection.  No discharge or erythema, or obvious scleral/conjunctival  abnormalities.  RESP: No audible wheeze, cough, or visible cyanosis.  No visible retractions or increased work of breathing.    SKIN: Visible skin clear. No significant rash, abnormal pigmentation or lesions.  PSYCH: Mentation appears normal, affect normal/bright, judgement and insight intact, normal speech and appearance well-groomed.     ASSESSMENT AND PLAN:  Belinda Cotton is a pleasant 40 year old female with a history of HTN and HL (not on statin therapy currently due to teratogenic concerns during child bearing years) whom recently has noted an increase in her BP again. She actually just was dx with kidney stones and a UTI yesterday and started treatment for this. For the time being we decided to hold off on additional medication changes for another week or two while she is recovering to see if her BP stabilizes again (she is not having any severely elevated readings or symptoms of a HTN urgency or emergency). If her BP is still elevated we could try a higher dose of labetalol or possible add nifedipine.    She will call our office in a couple weeks with an update.     An AVS will be provided to the patient.      Thank you for allowing me to participate in the care of this pleasant patient.  Please do not hesitate to contact us with further questions or concerns.     Sincerely,     Gloria Valero PA-C     [facility]

## 2020-12-23 NOTE — PATIENT INSTRUCTIONS
Thank you for your [facility] visit today. Your provider has recommended the following:  Medication Changes:  None right now  Recommendations:  Please call in a couple weeks with some BP readings to give us an update.   Follow-up:  TBD    Reminder:  Please bring in your current medication list or your medication, over the counter supplements and vitamin bottles as we will review these at each office visit.

## 2020-12-23 NOTE — PROGRESS NOTES
"Service Date: 12/23/2020     Belinda Cotton is a 40 year old female who is being evaluated via a billable video visit.      The patient has been notified of following:     \"This video visit will be conducted via a call between you and your physician/provider. We have found that certain health care needs can be provided without the need for an in-person physical exam.  This service lets us provide the care you need with a video conversation.  If a prescription is necessary we can send it directly to your pharmacy.  If lab work is needed we can place an order for that and you can then stop by our lab to have the test done at a later time.    Video visits are billed at different rates depending on your insurance coverage.  Please reach out to your insurance provider with any questions.    If during the course of the call the physician/provider feels a video visit is not appropriate, you will not be charged for this service.\"    Patient has given verbal consent for Video visit? Yes  How would you like to obtain your AVS? Mail a copy  If you are dropped from the video visit, the video invite should be resent to: Text to cell phone: [phone]  Will anyone else be joining your video visit? No       Review Of Systems  Skin: negative  Eyes: negative  Ears/Nose/Throat: negative  Respiratory: No shortness of breath, dyspnea on exertion, cough, or hemoptysis  Cardiovascular: negative  Gastrointestinal: negative  Genitourinary: positive for kidney stone  Musculoskeletal: negative  Neurologic: headaches  Psychiatric: negative  Hematologic/Lymphatic/Immunologic: negative  Endocrine: diabetes    Patient reported vitals:  BP:147/89  Heart rate:  Weight: 275lb      Dana Yu CMA      Video-Visit Details    Type of service:  Video Visit    Video Start Time: 3:42 PM  Video End Time: 3:51 PM    Originating Location (pt. Location): Home    Distant Location (provider location):  [facility] "     Platform used for Video Visit: RelTel       REASON FOR VISIT:  Belinda Cotton is a pleasant 40 year old female who is being evaluated via a billable video visit in order to minimize the possibility of exposure due to the current COVID-19 pandemic. She requested this visit due to elevated BP readings.      She has a hx of HTN and HL and has seen Dr Regalado a couple times for such. Most recently in Sept she was taken off lisinopril and Lipitor as she was still considering trying to conceive additional children. She was started on labetalol.    eBlinda states her BP was well controlled after starting the labetalol and she stopped checking it frequently. About 1-2 weeks ago, she was feeling a bit tired and had a headache, so she decided to check her BP. It has been somewhat elevated since then. -160s, DBP 80-90s. No CP or SOB. No recent diet changes or meds/supplements. Notes that yesterday she was dx with kidney stones and a UTI after experiencing flank pain. She was started on an abx.      CURRENT MEDICATIONS:   Current Outpatient Medications   Medication Sig Dispense Refill     escitalopram (LEXAPRO) 10 MG tablet 10 mg daily        labetalol (NORMODYNE) 200 MG tablet Take 1 tablet (200 mg) by mouth 2 times daily 60 tablet 11     metFORMIN (GLUCOPHAGE-XR) 500 MG 24 hr tablet 1,000 mg 2 times daily (with meals)        nitroFURantoin macrocrystal (MACRODANTIN) 100 MG capsule Take 100 mg by mouth 2 times daily       tamsulosin (FLOMAX) 0.4 MG capsule Take 0.4 mg by mouth daily         PHYSICAL EXAMINATION:   GENERAL: Healthy, alert and no distress  EYES: Eyes grossly normal to inspection.  No discharge or erythema, or obvious scleral/conjunctival abnormalities.  RESP: No audible wheeze, cough, or visible cyanosis.  No visible retractions or increased work of breathing.    SKIN: Visible skin clear. No significant rash, abnormal pigmentation or lesions.  PSYCH: Mentation appears normal, affect normal/bright,  judgement and insight intact, normal speech and appearance well-groomed.     ASSESSMENT AND PLAN:  Belinda Cotton is a pleasant 40 year old female with a history of HTN and HL (not on statin therapy currently due to teratogenic concerns during child bearing years) whom recently has noted an increase in her BP again. She actually just was dx with kidney stones and a UTI yesterday and started treatment for this. For the time being we decided to hold off on additional medication changes for another week or two while she is recovering to see if her BP stabilizes again (she is not having any severely elevated readings or symptoms of a HTN urgency or emergency). If her BP is still elevated we could try a higher dose of labetalol or possible add nifedipine.    She will call our office in a couple weeks with an update.     An AVS will be provided to the patient.      Thank you for allowing me to participate in the care of this pleasant patient.  Please do not hesitate to contact us with further questions or concerns.         ANATOLY ROBLES PA-C

## 2021-04-18 ENCOUNTER — HEALTH MAINTENANCE LETTER (OUTPATIENT)
Age: 41
End: 2021-04-18

## 2021-08-08 ENCOUNTER — HEALTH MAINTENANCE LETTER (OUTPATIENT)
Age: 41
End: 2021-08-08

## 2021-10-03 ENCOUNTER — HEALTH MAINTENANCE LETTER (OUTPATIENT)
Age: 41
End: 2021-10-03

## 2021-11-28 ENCOUNTER — HEALTH MAINTENANCE LETTER (OUTPATIENT)
Age: 41
End: 2021-11-28

## 2022-03-20 ENCOUNTER — HEALTH MAINTENANCE LETTER (OUTPATIENT)
Age: 42
End: 2022-03-20

## 2022-05-14 ENCOUNTER — HEALTH MAINTENANCE LETTER (OUTPATIENT)
Age: 42
End: 2022-05-14

## 2022-07-09 ENCOUNTER — HEALTH MAINTENANCE LETTER (OUTPATIENT)
Age: 42
End: 2022-07-09

## 2022-09-04 ENCOUNTER — HEALTH MAINTENANCE LETTER (OUTPATIENT)
Age: 42
End: 2022-09-04

## 2023-01-21 ENCOUNTER — HEALTH MAINTENANCE LETTER (OUTPATIENT)
Age: 43
End: 2023-01-21

## 2023-04-30 ENCOUNTER — HEALTH MAINTENANCE LETTER (OUTPATIENT)
Age: 43
End: 2023-04-30

## 2023-06-03 ENCOUNTER — HEALTH MAINTENANCE LETTER (OUTPATIENT)
Age: 43
End: 2023-06-03

## 2023-10-01 ENCOUNTER — HEALTH MAINTENANCE LETTER (OUTPATIENT)
Age: 43
End: 2023-10-01

## 2024-02-18 ENCOUNTER — HEALTH MAINTENANCE LETTER (OUTPATIENT)
Age: 44
End: 2024-02-18